# Patient Record
Sex: FEMALE | Race: WHITE
[De-identification: names, ages, dates, MRNs, and addresses within clinical notes are randomized per-mention and may not be internally consistent; named-entity substitution may affect disease eponyms.]

---

## 2018-05-05 ENCOUNTER — HOSPITAL ENCOUNTER (EMERGENCY)
Dept: HOSPITAL 17 - PHED | Age: 63
Discharge: HOME | End: 2018-05-05
Payer: COMMERCIAL

## 2018-05-05 VITALS
DIASTOLIC BLOOD PRESSURE: 82 MMHG | OXYGEN SATURATION: 97 % | HEART RATE: 58 BPM | SYSTOLIC BLOOD PRESSURE: 167 MMHG | RESPIRATION RATE: 16 BRPM

## 2018-05-05 VITALS
DIASTOLIC BLOOD PRESSURE: 92 MMHG | SYSTOLIC BLOOD PRESSURE: 169 MMHG | TEMPERATURE: 98.6 F | OXYGEN SATURATION: 98 % | RESPIRATION RATE: 18 BRPM | HEART RATE: 90 BPM

## 2018-05-05 VITALS — HEIGHT: 68 IN | BODY MASS INDEX: 23.89 KG/M2 | WEIGHT: 157.63 LBS

## 2018-05-05 VITALS
SYSTOLIC BLOOD PRESSURE: 156 MMHG | OXYGEN SATURATION: 98 % | DIASTOLIC BLOOD PRESSURE: 81 MMHG | RESPIRATION RATE: 16 BRPM | HEART RATE: 82 BPM

## 2018-05-05 VITALS — OXYGEN SATURATION: 98 % | RESPIRATION RATE: 16 BRPM

## 2018-05-05 VITALS — DIASTOLIC BLOOD PRESSURE: 82 MMHG | SYSTOLIC BLOOD PRESSURE: 164 MMHG | HEART RATE: 58 BPM

## 2018-05-05 DIAGNOSIS — R07.89: Primary | ICD-10-CM

## 2018-05-05 DIAGNOSIS — E87.1: ICD-10-CM

## 2018-05-05 LAB
BASOPHILS # BLD AUTO: 0 TH/MM3 (ref 0–0.2)
BASOPHILS NFR BLD: 0.4 % (ref 0–2)
BUN SERPL-MCNC: 13 MG/DL (ref 7–18)
CALCIUM SERPL-MCNC: 9.1 MG/DL (ref 8.5–10.1)
CHLORIDE SERPL-SCNC: 101 MEQ/L (ref 98–107)
CREAT SERPL-MCNC: 0.62 MG/DL (ref 0.5–1)
EOSINOPHIL # BLD: 0.1 TH/MM3 (ref 0–0.4)
EOSINOPHIL NFR BLD: 1.8 % (ref 0–4)
ERYTHROCYTE [DISTWIDTH] IN BLOOD BY AUTOMATED COUNT: 12.9 % (ref 11.6–17.2)
GFR SERPLBLD BASED ON 1.73 SQ M-ARVRAT: 98 ML/MIN (ref 89–?)
GLUCOSE SERPL-MCNC: 99 MG/DL (ref 74–106)
HCO3 BLD-SCNC: 25.5 MEQ/L (ref 21–32)
HCT VFR BLD CALC: 37.9 % (ref 35–46)
HGB BLD-MCNC: 12.8 GM/DL (ref 11.6–15.3)
INR PPP: 1 RATIO
LYMPHOCYTES # BLD AUTO: 0.9 TH/MM3 (ref 1–4.8)
LYMPHOCYTES NFR BLD AUTO: 28.2 % (ref 9–44)
MAGNESIUM SERPL-MCNC: 2.1 MG/DL (ref 1.5–2.5)
MCH RBC QN AUTO: 32.3 PG (ref 27–34)
MCHC RBC AUTO-ENTMCNC: 33.8 % (ref 32–36)
MCV RBC AUTO: 95.8 FL (ref 80–100)
MONOCYTE #: 0.4 TH/MM3 (ref 0–0.9)
MONOCYTES NFR BLD: 11.5 % (ref 0–8)
NEUTROPHILS # BLD AUTO: 1.9 TH/MM3 (ref 1.8–7.7)
NEUTROPHILS NFR BLD AUTO: 58.1 % (ref 16–70)
PLATELET # BLD: 201 TH/MM3 (ref 150–450)
PMV BLD AUTO: 8.3 FL (ref 7–11)
PROTHROMBIN TIME: 10.1 SEC (ref 9.8–11.6)
RBC # BLD AUTO: 3.96 MIL/MM3 (ref 4–5.3)
SODIUM SERPL-SCNC: 133 MEQ/L (ref 136–145)
TROPONIN I SERPL-MCNC: (no result) NG/ML (ref 0.02–0.05)
WBC # BLD AUTO: 3.3 TH/MM3 (ref 4–11)

## 2018-05-05 PROCEDURE — 85025 COMPLETE CBC W/AUTO DIFF WBC: CPT

## 2018-05-05 PROCEDURE — 83735 ASSAY OF MAGNESIUM: CPT

## 2018-05-05 PROCEDURE — 82552 ASSAY OF CPK IN BLOOD: CPT

## 2018-05-05 PROCEDURE — 99285 EMERGENCY DEPT VISIT HI MDM: CPT

## 2018-05-05 PROCEDURE — 71045 X-RAY EXAM CHEST 1 VIEW: CPT

## 2018-05-05 PROCEDURE — 85730 THROMBOPLASTIN TIME PARTIAL: CPT

## 2018-05-05 PROCEDURE — 85610 PROTHROMBIN TIME: CPT

## 2018-05-05 PROCEDURE — 80048 BASIC METABOLIC PNL TOTAL CA: CPT

## 2018-05-05 PROCEDURE — 93005 ELECTROCARDIOGRAM TRACING: CPT

## 2018-05-05 PROCEDURE — 84484 ASSAY OF TROPONIN QUANT: CPT

## 2018-05-05 PROCEDURE — 96374 THER/PROPH/DIAG INJ IV PUSH: CPT

## 2018-05-05 PROCEDURE — 82550 ASSAY OF CK (CPK): CPT

## 2018-05-05 NOTE — PD
HPI


Chief Complaint:  Chest Pain


Time Seen by Provider:  13:55


Travel History


International Travel<30 days:  No


Contact w/Intl Traveler<30days:  No


Traveled to known affect area:  No





History of Present Illness


HPI


Patient presents with complaints of chest tightness since last night when she 

rolled over.  Localized to the left pectoral region.  States it has lessened 

since then.  Constant nature.  No alleviating or aggravating factors states she 

has the urge to belch.  Denies any shortness of breath.  Radiates into the left 

arm.  Nondiabetic.  Non-smoker.  No cardiac history.  No history of 

hypertension or hyperlipidemia.  Positive family history of cardiac disease in 

her father who had a MI at age 40.  Currently on no prescription medications.  

She did take an aspirin and Gas-X this morning minimal relief.





PFSH


Past Medical History


Medical History:  Denies Significant Hx


Diminished Hearing:  No


Tetanus Vaccination:  Unknown


Influenza Vaccination:  No


Pregnant?:  Not Pregnant


Menopausal:  Yes





Past Surgical History


Surgical History:  No Previous Surgery





Social History


Alcohol Use:  Yes (daikly)


Tobacco Use:  No


Substance Use:  No





Allergies-Medications


(Allergen,Severity, Reaction):  


Coded Allergies:  


     No Known Allergies (Verified  Adverse Reaction, Unknown, 5/5/18)


Reported Meds & Prescriptions





Reported Meds & Active Scripts


Active


Reported


No Current Meds (Miscellaneous Medication)  Misc    








Review of Systems


General / Constitutional:  No: Fever


Eyes:  No: Visual changes


HENT:  No: Headaches


Cardiovascular:  Positive: Chest Pain or Discomfort


Respiratory:  No: Shortness of Breath


Gastrointestinal:  No: Abdominal Pain


Genitourinary:  No: Dysuria


Musculoskeletal:  No: Pain


Skin:  No Rash


Neurologic:  No: Weakness


Psychiatric:  No: Depression


Endocrine:  No: Polydipsia


Hematologic/Lymphatic:  No: Easy Bruising





Physical Exam


Narrative


GENERAL: Well-nourished, well-developed patient.


SKIN: Focused skin assessment warm/dry.


HEAD: Normocephalic.


EYES: No scleral icterus. No injection or drainage. 


NECK: Supple, trachea midline. No JVD or lymphadenopathy.


CARDIOVASCULAR: Regular rate and rhythm without murmurs, gallops, or rubs. 


RESPIRATORY: Breath sounds equal bilaterally. No accessory muscle use.


GASTROINTESTINAL: Abdomen soft, non-tender, nondistended. 


MUSCULOSKELETAL: No cyanosis, or edema. 


BACK: Nontender without obvious deformity. No CVA tenderness.





Data


Data


Last Documented VS





Vital Signs








  Date Time  Temp Pulse Resp B/P (MAP) Pulse Ox O2 Delivery O2 Flow Rate FiO2


 


5/5/18 15:05  82 16 156/81 (106) 98 Room Air  


 


5/5/18 13:19 98.6       








Orders





 Orders


Electrocardiogram (5/5/18 13:55)


Basic Metabolic Panel (Bmp) (5/5/18 13:55)


Ckmb (Isoenzyme) Profile (5/5/18 13:55)


Complete Blood Count With Diff (5/5/18 13:55)


Magnesium (Mg) (5/5/18 13:55)


Prothrombin Time / Inr (Pt) (5/5/18 13:55)


Act Partial Throm Time (Ptt) (5/5/18 13:55)


Troponin I (5/5/18 13:55)


Chest, Single Ap (5/5/18 13:55)


Ecg Monitoring (5/5/18 13:55)


Bilateral Bp Monitoring (5/5/18 13:55)


Iv Access Insert/Monitor (5/5/18 13:55)


Oximetry (5/5/18 13:55)


Oxygen Administration (5/5/18 13:55)


Sodium Chloride 0.9% Flush (Ns Flush) (5/5/18 14:00)


Ondansetron Inj (Zofran Inj) (5/5/18 14:00)


Al-Mag Hy-Si 40-40-4 Mg/Ml Liq (Mag-Al P (5/5/18 14:00)


Lidocaine 2% Viscous (Xylocaine 2% Visco (5/5/18 14:00)


Pantoprazole (Protonix) (5/5/18 14:00)


CKMB (5/5/18 14:05)


CKMB% (5/5/18 14:05)





Labs





Laboratory Tests








Test


  5/5/18


14:05


 


White Blood Count 3.3 TH/MM3 


 


Red Blood Count 3.96 MIL/MM3 


 


Hemoglobin 12.8 GM/DL 


 


Hematocrit 37.9 % 


 


Mean Corpuscular Volume 95.8 FL 


 


Mean Corpuscular Hemoglobin 32.3 PG 


 


Mean Corpuscular Hemoglobin


Concent 33.8 % 


 


 


Red Cell Distribution Width 12.9 % 


 


Platelet Count 201 TH/MM3 


 


Mean Platelet Volume 8.3 FL 


 


Neutrophils (%) (Auto) 58.1 % 


 


Lymphocytes (%) (Auto) 28.2 % 


 


Monocytes (%) (Auto) 11.5 % 


 


Eosinophils (%) (Auto) 1.8 % 


 


Basophils (%) (Auto) 0.4 % 


 


Neutrophils # (Auto) 1.9 TH/MM3 


 


Lymphocytes # (Auto) 0.9 TH/MM3 


 


Monocytes # (Auto) 0.4 TH/MM3 


 


Eosinophils # (Auto) 0.1 TH/MM3 


 


Basophils # (Auto) 0.0 TH/MM3 


 


CBC Comment DIFF FINAL 


 


Differential Comment  


 


Blood Urea Nitrogen 13 MG/DL 


 


Creatinine 0.62 MG/DL 


 


Random Glucose 99 MG/DL 


 


Calcium Level 9.1 MG/DL 


 


Magnesium Level 2.1 MG/DL 


 


Sodium Level 133 MEQ/L 


 


Potassium Level 3.5 MEQ/L 


 


Chloride Level 101 MEQ/L 


 


Carbon Dioxide Level 25.5 MEQ/L 


 


Anion Gap 7 MEQ/L 


 


Estimat Glomerular Filtration


Rate 98 ML/MIN 


 


 


Total Creatine Kinase 107 U/L 


 


Creatine Kinase MB 2.0 NG/ML 


 


Troponin I


  LESS THAN 0.02


NG/ML











MDM


Medical Decision Making


Medical Screen Exam Complete:  Yes


Emergency Medical Condition:  Yes


Differential Diagnosis


ACS, esophagitis, GERD, costochondritis


Narrative Course


Assessment plan discussed the patient at bedside.  EKG reveals sinus rhythm 

rate is 61.  Hyponatremia noted.  Cardiac enzymes negative.  Symptoms did 

improve with GI cocktail.  Increased belching noted.  I think chest discomfort 

is likely GI associated.  Patient admitted to eating habenero peppers last 

night for dinner.


Last 72 hours Impressions








Chest X-Ray 5/5/18 4265 Signed





Impressions: 





 Service Date/Time:  Saturday, May 5, 2018 14:00 - CONCLUSION:  Mild left lung 





 base atelectasis. No other acute cardiopulmonary disease identified.     

Clovis Mayes MD 











Diagnosis





 Primary Impression:  


 Chest discomfort


Patient Instructions:  General Instructions





***Additional Instructions:  


Encouraged to avoid aggravating factors reflux including but not limited to 

alcohol tobacco late and large meals spicy meals and weight.  Encouraged a 

bland high-fiber brat diet.  Follow-up with PCP.  Return to the emergency room 

with any onset of new symptoms.


***Med/Other Pt SpecificInfo:  Prescription(s) given


Scripts


Ranitidine (Zantac) 150 Mg Tab


150 MG PO BID for Reduce Stomach Acid, #30 TAB 0 Refills


   Prov: Rd Rashid MD         5/5/18 


Pantoprazole (Protonix) 40 Mg Tab


40 MG PO BID for Reflux, #30 TAB 0 Refills


   Prov: Rd Rashid MD         5/5/18


Disposition:  01 DISCHARGE HOME


Condition:  Good











Rd Rashid MD May 5, 2018 14:00

## 2018-05-05 NOTE — RADRPT
EXAM DATE/TIME:  05/05/2018 14:00 

 

[facility] COMPARISON:     

No previous studies available for comparison.

 

                     

INDICATIONS :     

Chest tightness, left hand tingling

                     

 

MEDICAL HISTORY :     

None.          

 

SURGICAL HISTORY :     

None.   

 

ENCOUNTER:     

Initial                                        

 

ACUITY:     

1 day      

 

PAIN SCORE:     

5/10

 

LOCATION:     

Bilateral chest 

 

FINDINGS:     

Single AP view the chest. Mild patchy left lung base opacity. Lungs otherwise clear. Diffuse bone dem
ineralization. Right convex thoracic scoliosis. Cardiomediastinal silhouette within normal limits.

 

CONCLUSION:     

Mild left lung base atelectasis. No other acute cardiopulmonary disease identified.

 

 

 

 Clovis Mayes MD on May 05, 2018 at 14:20           

Board Certified Radiologist.

 This report was verified electronically.

## 2018-05-06 NOTE — EKG
Date Performed: 05/05/2018       Time Performed: 13:42:17

 

PTAGE:      62 years

 

EKG:      Sinus rhythm 

 

 WITH SINUS ARRHYTHMIA NORMAL ECG

 

PREVIOUS TRACING       : 10/29/2009 19.14 Since the previous tracing, no significant change noted

 

DOCTOR:   Duran Choe  Interpretating Date/Time  05/06/2018 12:18:44